# Patient Record
Sex: FEMALE | Race: WHITE | NOT HISPANIC OR LATINO | Employment: OTHER | URBAN - METROPOLITAN AREA
[De-identification: names, ages, dates, MRNs, and addresses within clinical notes are randomized per-mention and may not be internally consistent; named-entity substitution may affect disease eponyms.]

---

## 2024-05-06 ENCOUNTER — OFFICE VISIT (OUTPATIENT)
Dept: OTOLARYNGOLOGY | Facility: CLINIC | Age: 54
End: 2024-05-06

## 2024-05-06 VITALS — BODY MASS INDEX: 24.01 KG/M2 | HEIGHT: 67 IN | WEIGHT: 153 LBS

## 2024-05-06 DIAGNOSIS — J38.3 VOCAL CORD ATROPHY: ICD-10-CM

## 2024-05-06 DIAGNOSIS — R49.0 DYSPHONIA: Primary | ICD-10-CM

## 2024-05-06 PROCEDURE — 99204 OFFICE O/P NEW MOD 45 MIN: CPT | Performed by: OTOLARYNGOLOGY

## 2024-05-06 PROCEDURE — 1036F TOBACCO NON-USER: CPT | Performed by: OTOLARYNGOLOGY

## 2024-05-06 ASSESSMENT — ENCOUNTER SYMPTOMS
NEUROLOGICAL NEGATIVE: 1
CONSTITUTIONAL NEGATIVE: 1
CARDIOVASCULAR NEGATIVE: 1
RESPIRATORY NEGATIVE: 1

## 2024-05-06 NOTE — PROGRESS NOTES
Subjective   Patient ID: Prabha Burns is a 53 y.o. female who presents for RASPY VOICE.    HPI  This patient has had evident hoarseness or raspy voice component since late October following upper respiratory infection which was quite severe.  Her voice is just never returned to normal.  She denies any pain or reflux symptomatology.  Remaining ENT inquiry is otherwise grossly clear without airway compromise.  Health is otherwise fairly good.    Review of Systems   Constitutional: Negative.    HENT: Negative.     Respiratory: Negative.     Cardiovascular: Negative.    Neurological: Negative.      Physical Exam    General appearance: No acute distress. Normal facies. Symmetric facial movement. No gross lesions of the face are noted.  The external ear structures appear normal. The ear canals patent and the tympanic membranes are intact without evidence of air-fluid levels, retraction, or congenital defects.  Anterior rhinoscopy notes essentially a midline nasal septum. Examination is noted for normal healthy mucosal membranes without any evidence of lesions, polyps, or exudate. The tongue is normally mobile. There are no lesions on the gingiva, buccal, or oral mucosa. There are no oral cavity masses.  The neck is negative for mass lymphadenopathy. The trachea and parotid are clear. The thyroid bed is grossly unremarkable. The salivary gland structures are grossly unremarkable.    Procedure:  In order to assess the larynx, flexible laryngoscopy was performed based on the patient's history.  After topical anesthesia, a very complete flexible laryngoscopy was performed. This examination reveals a normal appearance to the laryngeal structures including the true cords, false cords, epiglottis, base of tongue, and piriform sinus, except as noted.  Only notable finding is suspected atrophy of the right true vocal cord.  No mass tumor nodule polyp etc.  No signs of chronic infection.    Assessment/Plan     53-year-old  female with evidence of dysphonia likely related to atrophic vocal cord right side.  I am going to recommend that she have a one-time visit with our dedicated speech pathology colleague Cleveland Dubon to see if we can get her a stronger better voice.  Very detailed discussion with her in this regard with all questions answered.

## 2024-05-28 ENCOUNTER — EVALUATION (OUTPATIENT)
Dept: SPEECH THERAPY | Facility: CLINIC | Age: 54
End: 2024-05-28

## 2024-05-28 DIAGNOSIS — J38.3 VOCAL CORD ATROPHY: ICD-10-CM

## 2024-05-28 DIAGNOSIS — R49.0 DYSPHONIA: Primary | ICD-10-CM

## 2024-05-28 PROCEDURE — 92524 BEHAVRAL QUALIT ANALYS VOICE: CPT | Mod: GN

## 2024-05-28 ASSESSMENT — PAIN SCALES - GENERAL: PAINLEVEL_OUTOF10: 0 - NO PAIN

## 2024-05-28 ASSESSMENT — PAIN - FUNCTIONAL ASSESSMENT: PAIN_FUNCTIONAL_ASSESSMENT: 0-10

## 2024-05-28 NOTE — PROGRESS NOTES
Voice Evaluation       Patient Name: Prabha Burns  MRN: [id number]  : 1970  Today's Date: 24  Time Calculation  Start Time: 1435  Stop Time: 1520  Time Calculation (min): 45 min      Current Problem:  Diagnosis: Dysphonia  Vocal cord atrophy     Voice Assessment:   Oral motor examination:  Prabha Burns oral motor examination was within functional limits for function and structure.  No facial asymmetry or weakness noted.  Dentition was within functional limits.  Patient was able to complete diadochokinetic testing was within functional limits.  Upper back and neck musculature had mild increased tone specifically increased and left upper back musculature and left side of her neck anteriorly.  Patient was able to respond well to manual therapy techniques and light stretching techniques.     Patient was able to count from 1-21 using 3 breaths and recite the alphabet using 1 breaths.  She was able to read the rainbow passage with mild raspy vocal quality.  During the assessment the patient had evidence of clavicular breathing patterns.      Patient was able to sustain the /a/ phoneme for 11.10 seconds, the /i/ phoneme for 11.67 seconds, and the /u/ phoneme for 12.55 seconds.  She was able to complete pitch changes for both incremental and glide pitch changes with good pitch range and mild increased strain with high pitch sounds.  She was able to complete loudness change techniques with good loudness range and control.  Patient had mild increased hoarseness at lower end of loudness range and mild increased strain at higher end of loudness range.       An S to Z ratio was obtained.  The patient was able to sustain the /S/ phoneme for 19.68 seconds and the /Z/ phoneme for 7.49 seconds.  Patient's S/Z ratio was 2.62.  A normal adult female should be able to sustain an S or Z phoneme for an average of 15-20 seconds.  This indicates a mild deficit in breath support for voice and a significant  "vocal cord dysfunction.    Patient was given a vocally abusive checklist sheet to complete at home and bring to her initial session.  She was also instructed to increase her H2O intake to improve vocal hygiene.  She was also given a abdominal breathing techniques sheet to take home and practice 2 to 3 times per day.  Patient was able to demonstrate 80% efficiency completing the task in the supine position, 75% in the seated position and 75% in the standing position.    VHI-10  VHI-10  My voice makes it difficult for people to hear me.: Sometimes  People have difficulty understanding me in a noisy room.: Almost always  My voice difficulties restrict my personal and social life.: Sometimes  I feel left out of the conversations because of my voice: Never  My voice problem causes me to lose income.: Never  I feel as though I have to strain to produce voice.: Sometimes  The clarity of my voice is unpredictable.: Almost always  My voice problem upsets me.: Sometimes  My voice makes me feel handicapped.: Never  People ask, \"What's wrong with your voice?\": Sometimes  VHI-10 Total Score: 16        Voice Plan of Care:  Frequency: 1 session every other week  Duration: 4 sessions  skilled SLP warranted for voice treatment due to patient current vocal deficits, need for education and completion of vocal techniques to improve. Without skilled intervention, patient will not improve.   Recommendations for therapeutic interventions: Speech/Voice exercises  Prognosis: Good  Discussed Plan of Care: Patient  Patient/Caregiver Demonstrated Understanding: yes  Risk and Benefits Discussed with Patient/Caregiver/Other: yes    Subjective:  Prabha Burns was seen for a voice evaluation at [facility] on 05/29/24. Patient was seen for a 45 minute evaluation.     General Visit Information:  Living Environment: Home  Arrival: Independent  Ordering Physician: Vocal cord atrophyDr. Kobe Larkin, ENT  Reason for Referral: " dysphonia    Past Medical History Relevant to Rehab: Patient reports that she has a persistent rough voice and becomes exhausted when talking too much or singing.  She stated that this problem started in December 2023.  No other significant past medical history was reported.    Certification Period Start Date: 05/29/24  Certification Period End Date: 08/28/2024  Total Number of Visits : 1  Date of Onset: 12/2023       Goals:  1.  Pt will be able to tolerate manual therapy techniques to upper back and neck musculature.   Start Date: 05/28/2024  Progress: Patient tolerated myofascial release and stretching techniques well.  She was given stretching techniques to practice at home 2-3 times per day.  Status: Continue goal  2.  Pt will be able to complete breath support techniques with 90% accuracy.   Start Date: 05/28/2024  Progress: Patient was given abdominal breathing techniques to practice at home 2-3 times per day.  She was able to complete abdominal breathing in the supine position with 80% accuracy, seated position with 75% accuracy and standing position with 75% accuracy.  Status: Continue goal  3.  Pt will be able to complete vocal warmup techniques with 90% accuracy.   Start Date: Initiate at next treatment session  Progress: N/A  Status: Continue goal  4.  Pt will be able to complete pitch change techniques with 90% accuracy.   Start Date: Initiate at next treatment session  Progress: N/A   Status: Continue goal   5.  Pt will be able to complete a home program 2-3 times per day.  Start Date: 05/28/2024  Progress: Patient given home program abdominal breathing techniques, stretching techniques and vocally abusive checklist to complete at home before her next session.  Status: Continue goal      Pain Assessment:  Pain Assessment: 0-10  Pain Score: 0 - No pain      Education:  Individual(s) Educated: Patient  Verbal Education: Results of testing, Exercises  Written Education : Abdominal breathing  Response to  Education: Verbalized understanding  Patient/Caregiver Verbalized Understanding and Agreement: Yes

## 2024-06-12 ENCOUNTER — TREATMENT (OUTPATIENT)
Dept: SPEECH THERAPY | Facility: CLINIC | Age: 54
End: 2024-06-12

## 2024-06-12 DIAGNOSIS — R49.0 DYSPHONIA: Primary | ICD-10-CM

## 2024-06-12 DIAGNOSIS — J38.3 VOCAL CORD ATROPHY: ICD-10-CM

## 2024-06-12 PROCEDURE — 92507 TX SP LANG VOICE COMM INDIV: CPT | Mod: GN

## 2024-06-12 NOTE — PROGRESS NOTES
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  It made her aware that she continues to use clavicular breathing patterns spontaneously.  Patient tolerated myofascial release techniques and stretching techniques well today.  Spoke with patient regarding body mechanics and maintaining a 90 degree positioning while seated and while standing at her standing desk with suggestions of possibly using a separate keyboard and raising her screen to eye level due to her continued increased muscle tone in her upper back and neck musculature.  Introduced straw breathing techniques to patient.  Patient was able to maintain exhalation via straw today for 38.44 seconds.  She was encouraged to practice straw breathing 2 times per day x 5 repetitions.  Next, patient was given vocal warm up techniques to practice 2-3 times per day.  Initially she had mild hoarse vocal quality that improved with increased practice.  For several of the/h/+ vowel sounds she had mild hoarse vocal quality again.  She was able to improve her quality with cueing.  Patient was encouraged to think of functional phrases/sentences that she uses at home and at work for her next treatment session in 2 weeks.  Also introduced EMST 150 breather device to patient and had patient complete 5 repetitions using the EMST device.  Instructed patient on how to adjust the device during her repetitions as indicated in the instruction manual that comes with  the device.  Goal #6 will be added to patient's plan of care in regards to use of the EMST device.     Plan:  Frequency: 1 session every other week  Duration: 4 sessions  skilled SLP warranted for voice treatment due to patient current vocal deficits, need for education and completion of vocal techniques to improve. Without skilled intervention, patient will not improve.   Recommendations for therapeutic interventions: Speech/Voice exercises  Prognosis: Good  Discussed Plan of Care: Patient  Patient/Caregiver Demonstrated Understanding: yes  Risk and Benefits Discussed with Patient/Caregiver/Other: yes    Subjective   Patient was seen from 10:30 AM until 11:20 PM for a 50-minute voice treatment session.  She came into the session and stated she has already noticed improvements and her vocal quality.  She stated that she was able to sing yesterday and hit notes that she was attempting to achieve but was unable to maintain good vocal quality for long.        Pain Assessment:    1-10  0    Insurance:    Certification Period Start Date: 05/29/24  Certification Period End Date: 08/28/2024  Total Number of Visits : 2  Date of Onset: 12/2023    Goals:  1.  Pt will be able to tolerate manual therapy techniques to upper back and neck musculature.   Start Date: 05/28/2024  Progress: Patient tolerated myofascial release and stretching techniques well.  She was given stretching techniques to practice at home 2-3 times per day.  Status: Continue goal  2.  Pt will be able to complete breath support techniques with 90% accuracy.   Start Date: 05/28/2024  Progress: Patient was given abdominal breathing techniques to practice at home 2-3 times per day.  She was able to complete abdominal breathing in the supine position with 80% accuracy, seated position with 75% accuracy and standing position with 75% accuracy.  Status: Continue goal  3.  Pt will be able to complete vocal warmup techniques with 90% accuracy.   Start Date: Initiate  at next treatment session  Progress: N/A  Status: Continue goal  4.  Pt will be able to complete pitch change techniques with 90% accuracy.   Start Date: Initiate at next treatment session  Progress: N/A   Status: Continue goal   5.  Pt will be able to complete a home program 2-3 times per day.  Start Date: 05/28/2024  Progress: Patient given home program abdominal breathing techniques, stretching techniques and vocally abusive checklist to complete at home before her next session.  Status: Continue goal  6.  Patient will be able to complete 5 repetitions of 5 breaths x 5 days/week and will increase the pressure setting to her maximum expiratory strength as needed x 5 weeks.  Start date: 06/12/2024  Progress: Patient was able to complete 5 repetitions at a setting of 30 and was educated regarding increasing the pressure setting for future practice by one quarter turn.  Patient was able to achieve success with 30+1/2 turn level.  Status: Continue goal      Outpatient Education:   Patient educated regarding completion of straw breathing techniques, vocal warm up techniques and use of EM  device.

## 2024-06-26 ENCOUNTER — APPOINTMENT (OUTPATIENT)
Dept: SPEECH THERAPY | Facility: CLINIC | Age: 54
End: 2024-06-26

## 2024-07-10 ENCOUNTER — TREATMENT (OUTPATIENT)
Dept: SPEECH THERAPY | Facility: CLINIC | Age: 54
End: 2024-07-10

## 2024-07-10 DIAGNOSIS — J38.3 VOCAL CORD ATROPHY: ICD-10-CM

## 2024-07-10 DIAGNOSIS — R49.0 DYSPHONIA: Primary | ICD-10-CM

## 2024-07-10 PROCEDURE — 92507 TX SP LANG VOICE COMM INDIV: CPT | Mod: GN

## 2024-07-10 NOTE — PROGRESS NOTES
Speech-Language Pathology    SLP Adult Outpatient Speech-Language Pathology Treatment     Patient Name: Prabha Burns  MRN: [id number]  Today's Date: 7/10/2024  Time Calculation  Start Time: 1032  Stop Time: 1120  Time Calculation (min): 48 min       Current Problem:   Dysphonia  Vocal cord atrophy    SLP Assessment:  Patient came into the session today with improved vocal quality.  She states that she still has episodes of hoarse/raspy vocal quality especially when she uses her voice a lot during the day.  She also stated that she has not been diligent with completing her home program but has frequently completed the straw breathing techniques.  Patient states that she has a Gurwinder cup with a straw that she carries with her daily and that she has easy access to complete the straw breathing technique.  Encourage patient to heart rate her stretching techniques and vocal warm up techniques into her daily routine as well.  Patient states that she exercises daily.  Suggested that she complete her stretching techniques at the end or the beginning of her exercise routine.  She also stated that her father had a sound play technique that he would say warm up his vocal cords.  She was able to complete the past for the therapist.  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  Patient was in agreement with this plan to incorporate her therapy techniques into her daily routine.  During the treatment session patient was able to tolerate myofascial release techniques well.  She did have increased muscle tone in her upper right back musculature.  Increased focus was completed on upper right back musculature at the end of the session.  She had reduced muscle tone by the end of the session.  Patient is scheduled for 1 additional voice therapy treatment session.   Recommending to continue with plan.     Plan:  Frequency: 1 session every other week  Duration: 4 sessions  skilled SLP warranted for voice treatment due to patient current vocal deficits, need for education and completion of vocal techniques to improve. Without skilled intervention, patient will not improve.   Recommendations for therapeutic interventions: Speech/Voice exercises  Prognosis: Good  Discussed Plan of Care: Patient  Patient/Caregiver Demonstrated Understanding: yes  Risk and Benefits Discussed with Patient/Caregiver/Other: yes    Subjective   Patient was seen from 10:30 AM until 11:20 PM for a 50-minute voice treatment session.  She came into the session and stated she has already noticed improvements and her vocal quality.  She stated that she was able to sing yesterday and hit notes that she was attempting to achieve but was unable to maintain good vocal quality for long.        Pain Assessment:    1-10  0    Insurance:    Certification Period Start Date: 05/29/24  Certification Period End Date: 08/28/2024  Total Number of Visits : 3  Date of Onset: 12/2023    Goals:  1.  Pt will be able to tolerate manual therapy techniques to upper back and neck musculature.   Start Date: 05/28/2024  Progress: Patient tolerated myofascial release and stretching techniques well.  She was given stretching techniques to practice at home 2-3 times per day.  Status: Continue goal  2.  Pt will be able to complete breath support techniques with 90% accuracy.   Start Date: 05/28/2024  Progress: Patient reports that she has been diligent in completing a straw breathing techniques at home.  Recommending that she continue to complete straw breathing techniques daily.  Status: Continue goal  3.  Pt will be able to complete vocal warmup techniques with 90% accuracy.   Start Date: 07/10/2024  Progress: Patient given vocal warm up techniques to practice 2-3 times per day.  She was able to complete these techniques with good vocal  quality during session.  Status: Continue goal  4.  Pt will be able to complete pitch change techniques with 90% accuracy.   Start Date: 07/10/2024  Progress: Patient was able to complete pitch change techniques with good pitch range and vocal quality.  She did have mild increased strain with increased pitch.  Patient reported that she feels that her singing voice is not back to normal range at this.  Status: Continue goal   5.  Pt will be able to complete a home program 2-3 times per day.  Start Date: 05/28/2024  Progress: Patient given ways to incorporate her home program into her daily routine.  She stated that she will increase the frequency of completing her home program.    Status: Continue goal  6.  Patient will be able to complete 5 repetitions of 5 breaths x 5 days/week and will increase the pressure setting to her maximum expiratory strength as needed x 5 weeks.  Start date: 06/12/2024  Progress: Patient reported that she has not been consistently using her EMST device at home but would increase the frequency of completing task 5 x 5 repetitions for 5 days/week.    Status: Continue goal      Outpatient Education:   Patient educated regarding having home program to her daily routine to make it easier for her to be compliant with completing straw breathing techniques, vocal warm up techniques and use of EMST 150 device.

## 2024-07-24 ENCOUNTER — APPOINTMENT (OUTPATIENT)
Dept: SPEECH THERAPY | Facility: CLINIC | Age: 54
End: 2024-07-24

## 2025-06-04 ENCOUNTER — OFFICE VISIT (OUTPATIENT)
Dept: URGENT CARE | Age: 55
End: 2025-06-04
Payer: COMMERCIAL

## 2025-06-04 VITALS
TEMPERATURE: 98.2 F | WEIGHT: 153 LBS | HEART RATE: 106 BPM | HEIGHT: 67 IN | SYSTOLIC BLOOD PRESSURE: 131 MMHG | RESPIRATION RATE: 18 BRPM | OXYGEN SATURATION: 99 % | BODY MASS INDEX: 24.01 KG/M2 | DIASTOLIC BLOOD PRESSURE: 83 MMHG

## 2025-06-04 DIAGNOSIS — R20.0 NUMBNESS: ICD-10-CM

## 2025-06-04 DIAGNOSIS — R42 DIZZINESS: Primary | ICD-10-CM

## 2025-06-04 DIAGNOSIS — R51.9 ACUTE INTRACTABLE HEADACHE, UNSPECIFIED HEADACHE TYPE: ICD-10-CM

## 2025-06-04 PROCEDURE — 99205 OFFICE O/P NEW HI 60 MIN: CPT | Performed by: PHYSICIAN ASSISTANT

## 2025-06-04 PROCEDURE — 93000 ELECTROCARDIOGRAM COMPLETE: CPT | Performed by: PHYSICIAN ASSISTANT

## 2025-06-04 PROCEDURE — 3008F BODY MASS INDEX DOCD: CPT | Performed by: PHYSICIAN ASSISTANT

## 2025-06-04 PROCEDURE — 1036F TOBACCO NON-USER: CPT | Performed by: PHYSICIAN ASSISTANT

## 2025-06-04 NOTE — PROGRESS NOTES
"Subjective   Patient ID: Prabha Burns is a 55 y.o. female. They present today with a chief complaint of Vertigo.    History of Present Illness  HPI  This is a 55-year-old female here for dizziness.  States that started over the weekend while she was hiking.  She felt like her tent was spinning around.  She did try Epley maneuver but it did not seem to help nor make it worse.  The dizziness sensation have pretty much resolved but now she has a feeling of \"being drunk.\"  She feels off balance when she walks.  This sensation is constant.  She does have a headache now.  Her body feels tingly.  Denies visual services, slurred speech, facial drooping, chest pain, shortness of breath, palpitations, nausea, vomiting, or abdominal pain.  She has never had anything like this before.  Past Medical History  Allergies as of 06/04/2025    (No Known Allergies)       Prescriptions Prior to Admission[1]     Medical History[2]    Surgical History[3]     reports that she has never smoked. She has never used smokeless tobacco. She reports current alcohol use. She reports that she does not use drugs.    Review of Systems  Review of Systems   All other systems reviewed and are negative.                                 Objective    Vitals:    06/04/25 1847   BP: 131/83   BP Location: Left arm   Patient Position: Sitting   BP Cuff Size: Adult   Pulse: 106   Resp: 18   Temp: 36.8 °C (98.2 °F)   TempSrc: Oral   SpO2: 99%   Weight: 69.4 kg (153 lb)   Height: 1.702 m (5' 7\")     No LMP recorded. (Menstrual status: Menopausal).    Physical Exam  Physical Exam:    General: Vitals noted no distress. Afebrile. Normal phonation, no stridor, no trismus    ENT: Left TM is unremarkable. Right TM is unremarkable. Left external auditory canal is unremarkable. Right external auditory canal is unremarkable. Mastoids nontender. Normal conjunctival. Eyes are PERRLA. Posterior oropharynx without exudate or swelling.     Neck: Supple. No anterior " cervical lymphadenopathy. No posterior cervical chain lymphadenopathy    Cardiac: Regular rate rhythm    Lungs: Good aeration throughout. No adventitious breath sounds.    Abdomen: Soft, nontender, nonsurgical throughout.     Extremities: No peripheral edema    Skin: No rash    Neuro: No gross neurological deficits.  Cranial nerve V and VII are intact.  Alert and oriented x 4.  Cerebellar testing including finger-nose testing normal.  Romberg negative.  Normal gait.  Normal tippy toe walk.      Procedures    Point of Care Test & Imaging Results from this visit  No results found for this visit on 06/04/25.   Imaging  No results found.    Cardiology, Vascular, and Other Imaging  No other imaging results found for the past 2 days      Diagnostic study results (if any) were reviewed by Liam Larry PA-C.    Assessment/Plan   Allergies, medications, history, and pertinent labs/EKGs/Imaging reviewed by Liam Larry PA-C.     Medical Decision Making  MDM:      Summary: This is a 55-year-old female here for dizziness. Vital signs were reviewed. Patient is well-appearing nontoxic on exam. Differential diagnosis includes but not limited to arrhythmia, posterior CVA, BPPV, orthostatic hypotension, Ménière's disease.  Neurologically she is intact.  I am concerned about the constant dizziness since Monday.  She feels like she is drunk and off balance when she ambulates.    I am recommending that she go to the emergency department for further workup.  Patient declines EMS transport.  She will drive by private vehicle to [place] ED.  She signed AMA refusing ambulance transport.  Discussed patient with attending physician, Dr. Cunningham.       Orders and Diagnoses  There are no diagnoses linked to this encounter.    Medical Admin Record      Patient disposition: ED    Electronically signed by Liam Larry PA-C  7:06 PM           [1] (Not in a hospital admission)  [2]   Past Medical History:  Diagnosis Date    Personal  history of other diseases of the female genital tract 02/29/2020    History of abnormal uterine bleeding    Personal history of other diseases of the nervous system and sense organs 07/24/2015    History of impacted cerumen   [3]   Past Surgical History:  Procedure Laterality Date    OTHER SURGICAL HISTORY  02/29/2020    Laminectomy Decompress, Facetectomy, Foraminotomy Lumbar Seg    OTHER SURGICAL HISTORY  02/29/2020    Dilation and curettage